# Patient Record
Sex: MALE | Race: WHITE | URBAN - METROPOLITAN AREA
[De-identification: names, ages, dates, MRNs, and addresses within clinical notes are randomized per-mention and may not be internally consistent; named-entity substitution may affect disease eponyms.]

---

## 2024-08-09 ENCOUNTER — TELEPHONE (OUTPATIENT)
Dept: GASTROENTEROLOGY | Age: 29
End: 2024-08-09

## 2024-08-09 NOTE — TELEPHONE ENCOUNTER
Pt magi Christensen called [phone] needs a soon appt to have Peg Tube removed,pt had EGD on 7/1/2024

## 2024-08-20 ENCOUNTER — OFFICE VISIT (OUTPATIENT)
Dept: GASTROENTEROLOGY | Age: 29
End: 2024-08-20
Payer: COMMERCIAL

## 2024-08-20 VITALS
DIASTOLIC BLOOD PRESSURE: 91 MMHG | HEART RATE: 98 BPM | OXYGEN SATURATION: 98 % | WEIGHT: 179.8 LBS | SYSTOLIC BLOOD PRESSURE: 141 MMHG | TEMPERATURE: 97.2 F | RESPIRATION RATE: 16 BRPM | BODY MASS INDEX: 25.8 KG/M2

## 2024-08-20 DIAGNOSIS — Z43.1 PEG (PERCUTANEOUS ENDOSCOPIC GASTROSTOMY) ADJUSTMENT/REPLACEMENT/REMOVAL (HCC): Primary | ICD-10-CM

## 2024-08-20 PROCEDURE — 99213 OFFICE O/P EST LOW 20 MIN: CPT | Performed by: INTERNAL MEDICINE

## 2024-08-20 RX ORDER — OXYCODONE HYDROCHLORIDE 15 MG/1
15 TABLET ORAL EVERY 8 HOURS PRN
COMMUNITY

## 2024-08-20 RX ORDER — DIAZEPAM 5 MG/1
5 TABLET ORAL EVERY 12 HOURS PRN
COMMUNITY

## 2024-08-20 RX ORDER — BUMETANIDE 1 MG/1
1 TABLET ORAL DAILY
COMMUNITY

## 2024-08-20 ASSESSMENT — ENCOUNTER SYMPTOMS
VOMITING: 0
DIARRHEA: 0
CHOKING: 0
RECTAL PAIN: 0
ABDOMINAL PAIN: 1
ANAL BLEEDING: 0
NAUSEA: 0
COUGH: 0
WHEEZING: 0
BLOOD IN STOOL: 0
CONSTIPATION: 0
ABDOMINAL DISTENTION: 0
SORE THROAT: 0
TROUBLE SWALLOWING: 0
VOICE CHANGE: 0
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
## 2024-08-20 NOTE — TELEPHONE ENCOUNTER
Received voicemail from Rosibel requesting a call back.  (On HIPAA).  Returned call and she stated that they drove 2 hours to our office today thinking that the patient would be having his PEG tube removal.  Ended up being just an office visit to discuss.  Mentioned that they would have just did a phone visit instead of taking off from work.  Now the patient is in pain.  Requesting a call back from .  Writer thanked and call ended.  Emily advised.

## 2024-08-21 ENCOUNTER — ANESTHESIA EVENT (OUTPATIENT)
Dept: ENDOSCOPY | Age: 29
End: 2024-08-21
Payer: COMMERCIAL

## 2024-08-21 ENCOUNTER — TELEPHONE (OUTPATIENT)
Dept: GASTROENTEROLOGY | Age: 29
End: 2024-08-21

## 2024-08-21 ENCOUNTER — HOSPITAL ENCOUNTER (OUTPATIENT)
Dept: PREADMISSION TESTING | Age: 29
Setting detail: OUTPATIENT SURGERY
End: 2024-08-21
Payer: COMMERCIAL

## 2024-08-21 VITALS — BODY MASS INDEX: 26.51 KG/M2 | WEIGHT: 179 LBS | HEIGHT: 69 IN

## 2024-08-21 RX ORDER — LANOLIN ALCOHOL/MO/W.PET/CERES
400 CREAM (GRAM) TOPICAL DAILY
COMMUNITY

## 2024-08-21 RX ORDER — POTASSIUM BICARBONATE 782 MG/1
1 TABLET, EFFERVESCENT ORAL DAILY
COMMUNITY
Start: 2024-08-12

## 2024-08-21 RX ORDER — PANTOPRAZOLE SODIUM 40 MG/1
40 TABLET, DELAYED RELEASE ORAL 2 TIMES DAILY
COMMUNITY

## 2024-08-21 RX ORDER — PREGABALIN 50 MG/1
50 CAPSULE ORAL 2 TIMES DAILY
COMMUNITY

## 2024-08-21 RX ORDER — TAMSULOSIN HYDROCHLORIDE 0.4 MG/1
0.4 CAPSULE ORAL DAILY
COMMUNITY

## 2024-08-21 NOTE — TELEPHONE ENCOUNTER
Per Yulissa with [facility] Authorization, P2P will need to take place although CPT codes were provided. Please call #[phone] for the P2P and provide case #[id number]. Please contact [place] back at [phone] once P2P has been completed.

## 2024-08-21 NOTE — PROGRESS NOTES
Pre-op Instructions For Out-Patient Endoscopy Surgery    Medication Instructions:  Please stop herbs and any supplements now (includes vitamins and minerals).    Please contact your surgeon and prescribing physician for pre-op instructions for any blood thinners.    If you have inhalers/aerosol treatments at home, please use them the morning of your surgery and bring the inhalers with you to the hospital.    Please take the following medications the morning of your surgery with a sip of water:    None     Surgery Instructions:  After midnight before surgery:  Do not eat or drink anything, including water, mints, gum, and hard candy.  You may brush your teeth without swallowing.  No smoking, chewing tobacco, or street drugs.    Please shower or bathe before surgery.       Please do not wear any cologne, lotion, powder, jewelry, piercings, perfume, makeup, nail polish, hair accessories, or hair spray on the day of surgery.  Wear loose comfortable clothing.    Leave your valuables at home but bring a payment source for any after-surgery prescriptions you plan to fill at [facility].  Bring a storage case for any glasses/contacts.    An adult who is responsible for you MUST drive you home and should be with you for the first 24 hours after surgery.     The Day of Surgery:  Arrive at [facility] Surgery Entrance at the time directed by your surgeon and check in at the desk. 9:30 am     If you have a living will or healthcare power of , please bring a copy.    You will be taken to the pre-op holding area where you will be prepared for surgery.  A physical assessment will be performed by a nurse practitioner or house officer.  Your IV will be started and you will meet your anesthesiologist.    When you go to surgery, your family will be directed to the surgical waiting room, where the doctor should speak with them after your surgery.    After surgery, you will be taken to the recovery area.

## 2024-08-21 NOTE — TELEPHONE ENCOUNTER
Completed [id number] for EGD with PEG removal  Authorization # [id number]  Valid through 10/19/24

## 2024-08-21 NOTE — TELEPHONE ENCOUNTER
Procedure scheduled/Dr Dhaliwal  Procedure:EGD peg tube removal  Dx:PEG (percutaneous endoscopic gastrostomy) adjustment/replacement/removal   Date:08/22/2024  Time:11:30 am / Arrive: 9:30 am   Hospital:[facility] phone call:sreekanth  Bowel Prep instructions given:egd instructions   In office/via phone: office  Clearance needed:none

## 2024-08-21 NOTE — TELEPHONE ENCOUNTER
Voicemail left d/t pt having procedure scheduled tomorrow and cpt code is coming up invalid and needs to know what to change it to.Please advise.  C/b [phone] [id number]

## 2024-08-22 ENCOUNTER — ANESTHESIA (OUTPATIENT)
Dept: ENDOSCOPY | Age: 29
End: 2024-08-22
Payer: COMMERCIAL

## 2024-08-22 ENCOUNTER — HOSPITAL ENCOUNTER (OUTPATIENT)
Age: 29
Setting detail: OUTPATIENT SURGERY
Discharge: HOME | End: 2024-08-22
Attending: INTERNAL MEDICINE | Admitting: INTERNAL MEDICINE
Payer: COMMERCIAL

## 2024-08-22 VITALS
TEMPERATURE: 98.9 F | RESPIRATION RATE: 12 BRPM | DIASTOLIC BLOOD PRESSURE: 73 MMHG | HEIGHT: 69 IN | HEART RATE: 64 BPM | WEIGHT: 179 LBS | SYSTOLIC BLOOD PRESSURE: 107 MMHG | OXYGEN SATURATION: 99 % | BODY MASS INDEX: 26.51 KG/M2

## 2024-08-22 DIAGNOSIS — Z43.1 PEG (PERCUTANEOUS ENDOSCOPIC GASTROSTOMY) ADJUSTMENT/REPLACEMENT/REMOVAL (HCC): ICD-10-CM

## 2024-08-22 PROCEDURE — 2500000003 HC RX 250 WO HCPCS: Performed by: NURSE ANESTHETIST, CERTIFIED REGISTERED

## 2024-08-22 PROCEDURE — 6360000002 HC RX W HCPCS: Performed by: NURSE ANESTHETIST, CERTIFIED REGISTERED

## 2024-08-22 PROCEDURE — 2580000003 HC RX 258: Performed by: ANESTHESIOLOGY

## 2024-08-22 PROCEDURE — 43239 EGD BIOPSY SINGLE/MULTIPLE: CPT | Performed by: INTERNAL MEDICINE

## 2024-08-22 PROCEDURE — 2709999900 HC NON-CHARGEABLE SUPPLY: Performed by: INTERNAL MEDICINE

## 2024-08-22 PROCEDURE — 88305 TISSUE EXAM BY PATHOLOGIST: CPT

## 2024-08-22 PROCEDURE — 43247 EGD REMOVE FOREIGN BODY: CPT | Performed by: INTERNAL MEDICINE

## 2024-08-22 PROCEDURE — 7100000011 HC PHASE II RECOVERY - ADDTL 15 MIN: Performed by: INTERNAL MEDICINE

## 2024-08-22 PROCEDURE — 3700000001 HC ADD 15 MINUTES (ANESTHESIA): Performed by: INTERNAL MEDICINE

## 2024-08-22 PROCEDURE — 7100000010 HC PHASE II RECOVERY - FIRST 15 MIN: Performed by: INTERNAL MEDICINE

## 2024-08-22 PROCEDURE — 3609012400 HC EGD TRANSORAL BIOPSY SINGLE/MULTIPLE: Performed by: INTERNAL MEDICINE

## 2024-08-22 PROCEDURE — 3700000000 HC ANESTHESIA ATTENDED CARE: Performed by: INTERNAL MEDICINE

## 2024-08-22 RX ORDER — SODIUM CHLORIDE 0.9 % (FLUSH) 0.9 %
5-40 SYRINGE (ML) INJECTION PRN
Status: DISCONTINUED | OUTPATIENT
Start: 2024-08-22 | End: 2024-08-22 | Stop reason: HOSPADM

## 2024-08-22 RX ORDER — SODIUM CHLORIDE 0.9 % (FLUSH) 0.9 %
5-40 SYRINGE (ML) INJECTION EVERY 12 HOURS SCHEDULED
Status: DISCONTINUED | OUTPATIENT
Start: 2024-08-22 | End: 2024-08-22 | Stop reason: HOSPADM

## 2024-08-22 RX ORDER — LIDOCAINE HYDROCHLORIDE 10 MG/ML
1 INJECTION, SOLUTION EPIDURAL; INFILTRATION; INTRACAUDAL; PERINEURAL
Status: DISCONTINUED | OUTPATIENT
Start: 2024-08-22 | End: 2024-08-22 | Stop reason: HOSPADM

## 2024-08-22 RX ORDER — PROPOFOL 10 MG/ML
INJECTION, EMULSION INTRAVENOUS CONTINUOUS PRN
Status: DISCONTINUED | OUTPATIENT
Start: 2024-08-22 | End: 2024-08-22 | Stop reason: SDUPTHER

## 2024-08-22 RX ORDER — SODIUM CHLORIDE 9 MG/ML
INJECTION, SOLUTION INTRAVENOUS PRN
Status: DISCONTINUED | OUTPATIENT
Start: 2024-08-22 | End: 2024-08-22 | Stop reason: HOSPADM

## 2024-08-22 RX ORDER — SODIUM CHLORIDE, SODIUM LACTATE, POTASSIUM CHLORIDE, CALCIUM CHLORIDE 600; 310; 30; 20 MG/100ML; MG/100ML; MG/100ML; MG/100ML
INJECTION, SOLUTION INTRAVENOUS CONTINUOUS
Status: DISCONTINUED | OUTPATIENT
Start: 2024-08-22 | End: 2024-08-22 | Stop reason: HOSPADM

## 2024-08-22 RX ORDER — LIDOCAINE HYDROCHLORIDE 20 MG/ML
INJECTION, SOLUTION INFILTRATION; PERINEURAL PRN
Status: DISCONTINUED | OUTPATIENT
Start: 2024-08-22 | End: 2024-08-22 | Stop reason: SDUPTHER

## 2024-08-22 RX ADMIN — PROPOFOL 200 MCG/KG/MIN: 10 INJECTION, EMULSION INTRAVENOUS at 11:09

## 2024-08-22 RX ADMIN — SODIUM CHLORIDE, POTASSIUM CHLORIDE, SODIUM LACTATE AND CALCIUM CHLORIDE: 600; 310; 30; 20 INJECTION, SOLUTION INTRAVENOUS at 10:18

## 2024-08-22 RX ADMIN — LIDOCAINE HYDROCHLORIDE 100 MG: 20 INJECTION, SOLUTION INFILTRATION; PERINEURAL at 11:09

## 2024-08-22 ASSESSMENT — PAIN - FUNCTIONAL ASSESSMENT
PAIN_FUNCTIONAL_ASSESSMENT: 0-10
PAIN_FUNCTIONAL_ASSESSMENT: NONE - DENIES PAIN

## 2024-08-22 ASSESSMENT — ENCOUNTER SYMPTOMS
SHORTNESS OF BREATH: 1
BACK PAIN: 1
GASTROINTESTINAL NEGATIVE: 1
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
## 2024-08-22 NOTE — OP NOTE
PROCEDURE NOTE    DATE OF PROCEDURE: 8/22/2024     SURGEON: Elina Dhaliwal MD  Facility: [facility]  ASSISTANT: None  Anesthesia: MAC  PREOPERATIVE DIAGNOSIS:   Pain removal    Diagnosis:  PEG was removed successfully  Gastritis biopsies were taken    POSTOPERATIVE DIAGNOSIS: As described below    OPERATION: Upper GI endoscopy with Biopsy    ANESTHESIA: Moderate Sedation     ESTIMATED BLOOD LOSS: Less than 50 ml    COMPLICATIONS: None.     SPECIMENS:  Was Obtained: As above    HISTORY: The patient is a 29 y.o. year old male with history of above preop diagnosis.  I recommended esophagogastroduodenoscopy with possible biopsy and I explained the risk, benefits, expected outcome, and alternatives to the procedure.  Risks included but are not limited to bleeding, infection, respiratory distress, hypotension, and perforation of the esophagus, stomach, or duodenum.  Patient understands and is in agreement.      The patient was counseled at length about the risks of gokul Covid-19 during their perioperative period and any recovery window from their procedure.  The patient was made aware that gokul Covid-19  may worsen their prognosis for recovering from their procedure  and lend to a higher morbidity and/or mortality risk.  All material risks, benefits, and reasonable alternatives including postponing the procedure were discussed. The patient does wish to proceed with the procedure at this time.         PROCEDURE: The patient was given IV conscious sedation.  The patient's SPO2 remained above 90% throughout the procedure.The gastroscope was inserted orally and advanced under direct vision through the esophagus, through the stomach, through the pylorus, and into the descending duodenum.      Post sedation note :The patient's SPO2 remained above 90% throughout the procedure.the vital signs remained stable , and no immediate complication form the procedure noted, patient will be ready for d/c when criteria

## 2024-08-22 NOTE — ANESTHESIA PRE PROCEDURE
2 times daily  Patient not taking: Reported on 8/21/2024 7/8/24   Adriane Kim APRN - CNP   ondansetron (ZOFRAN-ODT) 4 MG disintegrating tablet Take 1 tablet by mouth every 8 hours as needed for Nausea or Vomiting  Patient not taking: Reported on 8/20/2024 7/8/24   Adriane Kim APRN - CNP   cloNIDine (CATAPRES) 0.2 MG/24HR PTWK Place 1 patch onto the skin once a week  Patient not taking: Reported on 8/20/2024 7/13/24   Adriane Kim APRN - CNP   doxazosin (CARDURA) 4 MG tablet Take 1 tablet by mouth nightly  Patient not taking: Reported on 8/20/2024 7/8/24   Adriane Kim APRN - CNP   QUEtiapine (SEROQUEL) 50 MG tablet Take 5 tablets by mouth nightly 7/8/24   Adriane Kim APRN - CNP   carvedilol (COREG) 25 MG tablet Take 1 tablet by mouth 2 times daily  Patient not taking: Reported on 8/20/2024 7/8/24   Adriane Kim APRN - CNP   amLODIPine (NORVASC) 10 MG tablet Take 1 tablet by mouth daily  Patient not taking: Reported on 8/20/2024 7/9/24   Adriane Kim APRN - CNP   acetylcysteine (MUCOMYST) 20 % nebulizer solution Inhale 3 mLs into the lungs in the morning and 3 mLs in the evening.  Patient not taking: Reported on 8/20/2024 7/8/24   Adriane Kim APRN - CNP   dexmedeTOMIDine HCl in NaCl (PRECEDEX) 400 MCG/100ML SOLN infusion Infuse 41.2 mcg/hr intravenously continuous  Patient not taking: Reported on 8/20/2024 7/8/24   Adriane Kim APRN - CNP   metoclopramide (REGLAN) 5 MG/ML injection Infuse 2 mLs intravenously every 6 hours  Patient not taking: Reported on 8/20/2024 7/8/24   Adriane Kim APRN - CNP   naloxegol (MOVANTIK) 25 MG TABS tablet 1 tablet by Orogastric route every morning (before breakfast) 7/9/24   Adriane Kim APRN - CNP   pantoprazole 4 mg/mL in sodium chloride (PF) injection Infuse 10 mLs intravenously in the morning and at bedtime  Patient not taking: Reported on 8/20/2024 7/8/24   Adriane Kim APRN - CNP       Current medications:    Current

## 2024-08-22 NOTE — ANESTHESIA POSTPROCEDURE EVALUATION
Department of Anesthesiology  Postprocedure Note    Patient: Usman Emmanuel Jr  MRN: [id number]  YOB: 1995  Date of evaluation: 8/22/2024    Procedure Summary       Date: 08/22/24 Room / Location: [place] [address] / [facility]    Anesthesia Start: 1034 Anesthesia Stop: 1135    Procedure: EGD WITH SNARE BIOPSY - /REMOVAL PERCUTANEOUS ENDOSCOPIC GASTROSTOMY TUBE (Esophagus) Diagnosis:       PEG (percutaneous endoscopic gastrostomy) adjustment/replacement/removal ([facility])      (PEG (percutaneous endoscopic gastrostomy) adjustment/replacement/removal ([facility]) [Z43.1])    Surgeons: Elina Dhaliwal MD Responsible Provider: Ynes Rivera MD    Anesthesia Type: MAC ASA Status: 3            Anesthesia Type: MAC    Joni Phase I:      Joni Phase II: Joni Score: 10    Anesthesia Post Evaluation    Comments: POST- ANESTHESIA EVALUATION       Pt Name: Usman Emmanuel Jr  MRN: [id number]  YOB: 1995  Date of evaluation: 8/22/2024  Time:  2:00 PM      /73   Pulse 64   Temp 98.9 °F (37.2 °C) (Infrared)   Resp 12   Ht 1.753 m (5' 9\")   Wt 81.2 kg (179 lb)   SpO2 99%   BMI 26.43 kg/m²      Consciousness Level  Awake  Cardiopulmonary Status  Stable  Pain Adequately Treated YES  Nausea / Vomiting  NO  Adequate Hydration  YES  Anesthesia Related Complications NONE      Electronically signed by Ynes Rivera MD on 8/22/2024 at 2:00 PM           No notable events documented.

## 2024-08-26 LAB — SURGICAL PATHOLOGY REPORT: NORMAL

## 2024-09-05 NOTE — TELEPHONE ENCOUNTER
Writer called pt to schedule f/u from EGD/Peg tube removal.  Pt states that he does not want to schedule an appt due to feeling much better.

## (undated) DEVICE — BITEBLOCK 54FR W/ DENT RIM BLOX

## (undated) DEVICE — ENDO KIT W/SYRINGE: Brand: MEDLINE INDUSTRIES, INC.

## (undated) DEVICE — SNARE ENDOSCP AD SM L240CM LOOP W1.3CM SHTH DIA2.4MM POLYP

## (undated) DEVICE — FORCEPS BX L240CM JAW DIA2.8MM L CAP W/ NDL MIC MESH TOOTH

## (undated) DEVICE — DEFENDO AIR WATER SUCTION AND BIOPSY VALVE KIT FOR  OLYMPUS: Brand: DEFENDO AIR/WATER/SUCTION AND BIOPSY VALVE

## (undated) DEVICE — GLOVE SURG SZ 75 L12IN FNGR THK79MIL GRN LTX FREE